# Patient Record
Sex: MALE | Race: BLACK OR AFRICAN AMERICAN | Employment: FULL TIME | URBAN - METROPOLITAN AREA
[De-identification: names, ages, dates, MRNs, and addresses within clinical notes are randomized per-mention and may not be internally consistent; named-entity substitution may affect disease eponyms.]

---

## 2017-08-21 ENCOUNTER — HOSPITAL ENCOUNTER (INPATIENT)
Age: 27
LOS: 2 days | Discharge: HOME | DRG: 897 | End: 2017-08-23
Attending: PSYCHIATRY & NEUROLOGY | Admitting: PSYCHIATRY & NEUROLOGY
Payer: COMMERCIAL

## 2017-08-21 PROBLEM — F25.9 SCHIZOAFFECTIVE DISORDER (HCC): Status: ACTIVE | Noted: 2017-08-21

## 2017-08-21 PROCEDURE — 65220000003 HC RM SEMIPRIVATE PSYCH

## 2017-08-21 RX ORDER — IBUPROFEN 200 MG
1 TABLET ORAL
Status: DISCONTINUED | OUTPATIENT
Start: 2017-08-21 | End: 2017-08-23 | Stop reason: HOSPADM

## 2017-08-21 RX ORDER — IBUPROFEN 400 MG/1
400 TABLET ORAL
Status: DISCONTINUED | OUTPATIENT
Start: 2017-08-21 | End: 2017-08-23 | Stop reason: HOSPADM

## 2017-08-21 RX ORDER — BENZTROPINE MESYLATE 2 MG/1
2 TABLET ORAL
Status: DISCONTINUED | OUTPATIENT
Start: 2017-08-21 | End: 2017-08-23 | Stop reason: HOSPADM

## 2017-08-21 RX ORDER — BENZTROPINE MESYLATE 1 MG/ML
2 INJECTION INTRAMUSCULAR; INTRAVENOUS
Status: DISCONTINUED | OUTPATIENT
Start: 2017-08-21 | End: 2017-08-23 | Stop reason: HOSPADM

## 2017-08-21 RX ORDER — OLANZAPINE 5 MG/1
5 TABLET ORAL
Status: DISCONTINUED | OUTPATIENT
Start: 2017-08-21 | End: 2017-08-23 | Stop reason: HOSPADM

## 2017-08-21 RX ORDER — LORAZEPAM 2 MG/ML
2 INJECTION INTRAMUSCULAR
Status: DISCONTINUED | OUTPATIENT
Start: 2017-08-21 | End: 2017-08-23 | Stop reason: HOSPADM

## 2017-08-21 RX ORDER — LORAZEPAM 1 MG/1
1 TABLET ORAL
Status: DISCONTINUED | OUTPATIENT
Start: 2017-08-21 | End: 2017-08-23 | Stop reason: HOSPADM

## 2017-08-21 RX ORDER — ADHESIVE BANDAGE
30 BANDAGE TOPICAL DAILY PRN
Status: DISCONTINUED | OUTPATIENT
Start: 2017-08-21 | End: 2017-08-23 | Stop reason: HOSPADM

## 2017-08-21 RX ORDER — ACETAMINOPHEN 325 MG/1
650 TABLET ORAL
Status: DISCONTINUED | OUTPATIENT
Start: 2017-08-21 | End: 2017-08-23 | Stop reason: HOSPADM

## 2017-08-21 RX ORDER — ZOLPIDEM TARTRATE 10 MG/1
10 TABLET ORAL
Status: DISCONTINUED | OUTPATIENT
Start: 2017-08-21 | End: 2017-08-23 | Stop reason: HOSPADM

## 2017-08-21 NOTE — PROGRESS NOTES
Skin Assessment performed by: VIRA, RN and AMANDA BACK    Skin is intact      Pressure Ulcer Documentation  (COMPLETE ONE LABEL PER PRESSURE ULCER)  For further information, please review corresponding Wound Care flowsheet. Yeimi Roman has:    No pressure injury noted and pressure ulcer prevention initiated.

## 2017-08-21 NOTE — IP AVS SNAPSHOT
[address] 
[phone] Patient: Luis White MRN: [id number] Current Discharge Medication List  
  
CONTINUE these medications which have NOT CHANGED Dose & Instructions Dispensing Information Comments Morning Noon Evening Bedtime ZyrTEC 10 mg tablet Generic drug:  cetirizine Notes to Patient:  No prescription Dose:  10 mg Take 10 mg by mouth daily as needed for Allergies. Refills:  0

## 2017-08-21 NOTE — BH NOTES
Hospitalist Consult called on:    Date: 8/21/2017  Time: 9111  Reason for consult: Pt arrived from outside facility  Urgency: Awaiting return call from Hospitalist to notify of consult. [facility]  Dr. Paras yDe advised of consult.

## 2017-08-21 NOTE — IP AVS SNAPSHOT
[address] 
[phone] Patient: Carmella Giles MRN: [id number] YEH:1/69/0358 You are allergic to the following No active allergies Recent Documentation Height Weight BMI Smoking Status 1.854 m 86.2 kg 25.07 kg/m2 Never Smoker Emergency Contacts Name Discharge Info Relation Home Work Mobile Ralph De Luna DISCHARGE CAREGIVER [3] Mother [14] [phone] About your hospitalization You were admitted on:  August 21, 2017 You last received care in the:  [facility] You were discharged on:  August 23, 2017 Unit phone number:  [phone] Why you were hospitalized Your primary diagnosis was:  Not on File Your diagnoses also included:  Schizoaffective Disorder (Hcc), Cannabis Intoxication (Hcc) Providers Seen During Your Hospitalizations Provider Role Specialty Primary office phone Manny Calvo MD Attending Provider Psychiatry [phone] Your Primary Care Physician (PCP) Primary Care Physician Office Phone Office Fax NONE ** None ** ** None ** Follow-up Information Follow up With Details Comments Contact Info [address] [place] [address] On 8/24/2017 Walk in for intake Monday - Thursday 8:30 to 5:00 and Friday 8:30 to 2:00 please lisa ID , any inurance information and medication list [place] [address] [address] [address] [phone] None   None (395) Patient stated that they have no PCP Current Discharge Medication List  
  
CONTINUE these medications which have NOT CHANGED Dose & Instructions Dispensing Information Comments Morning Noon Evening Bedtime ZyrTEC 10 mg tablet Generic drug:  cetirizine Notes to Patient:  No prescription Dose:  10 mg Take 10 mg by mouth daily as needed for Allergies. Refills:  0 Discharge Instructions DISCHARGE SUMMARY from Nurse The following personal items are in your possession at time of discharge: 
 
Dental Appliances: None Visual Aid: None Home Medications: None Jewelry: None Clothing: None Other Valuables: Personal toiletries (Toothbrush, pack of bar soap, lotion) Personal Items Sent to Safe: None Personal items returned to patient No prescriptions given PATIENT INSTRUCTIONS: 
 
 
 
What to do at Home: 
Recommended activity: Activity as tolerated, *  Please give a list of your current medications to your Primary Care Provider. *  Please update this list whenever your medications are discontinued, doses are 
    changed, or new medications (including over-the-counter products) are added. *  Please carry medication information at all times in case of emergency situations. These are general instructions for a healthy lifestyle: No smoking/ No tobacco products/ Avoid exposure to second hand smoke Surgeon General's Warning:  Quitting smoking now greatly reduces serious risk to your health. Obesity, smoking, and sedentary lifestyle greatly increases your risk for illness A healthy diet, regular physical exercise & weight monitoring are important for maintaining a healthy lifestyle You may be retaining fluid if you have a history of heart failure or if you experience any of the following symptoms:  Weight gain of 3 pounds or more overnight or 5 pounds in a week, increased swelling in our hands or feet or shortness of breath while lying flat in bed. Please call your doctor as soon as you notice any of these symptoms; do not wait until your next office visit. Recognize signs and symptoms of STROKE: 
 
F-face looks uneven A-arms unable to move or move unevenly S-speech slurred or non-existent T-time-call 911 as soon as signs and symptoms begin-DO NOT go  
 Back to bed or wait to see if you get better-TIME IS BRAIN. Warning Signs of HEART ATTACK Call 911 if you have these symptoms: 
? Chest discomfort. Most heart attacks involve discomfort in the center of the chest that lasts more than a few minutes, or that goes away and comes back. It can feel like uncomfortable pressure, squeezing, fullness, or pain. ? Discomfort in other areas of the upper body. Symptoms can include pain or discomfort in one or both arms, the back, neck, jaw, or stomach. ? Shortness of breath with or without chest discomfort. ? Other signs may include breaking out in a cold sweat, nausea, or lightheadedness. Don't wait more than five minutes to call [address]! Fast action can save your life. Calling 911 is almost always the fastest way to get lifesaving treatment. Emergency Medical Services staff can begin treatment when they arrive  up to an hour sooner than if someone gets to the hospital by car. The discharge information has been reviewed with the patient. The patient verbalized understanding. Discharge medications reviewed with the patient and appropriate educational materials and side effects teaching were provided. Gociety Activation Thank you for requesting access to Gociety. Please follow the instructions below to securely access and download your online medical record. Gociety allows you to send messages to your doctor, view your test results, renew your prescriptions, schedule appointments, and more. How Do I Sign Up? 1. In your internet browser, go to [email or web address] 
2. Click on the First Time User? Click Here link in the Sign In box. You will be redirect to the New Member Sign Up page. 3. Enter your Gociety Access Code exactly as it appears below. You will not need to use this code after youve completed the sign-up process. If you do not sign up before the expiration date, you must request a new code. PeekYou Access Code: [email or web address] Expires: 2017 11:48 AM (This is the date your PeekYou access code will ) 4. Enter the last four digits of your Social Security Number (xxxx) and Date of Birth (mm/dd/yyyy) as indicated and click Submit. You will be taken to the next sign-up page. 5. Create a PeekYou ID. This will be your PeekYou login ID and cannot be changed, so think of one that is secure and easy to remember. 6. Create a PeekYou password. You can change your password at any time. 7. Enter your Password Reset Question and Answer. This can be used at a later time if you forget your password. 8. Enter your e-mail address. You will receive e-mail notification when new information is available in [address] 9. Click Sign Up. You can now view and download portions of your medical record. 10. Click the Download Summary menu link to download a portable copy of your medical information. Additional Information If you have questions, please visit the Frequently Asked Questions section of the PeekYou website at [email or web address] Remember, PeekYou is NOT to be used for urgent needs. For medical emergencies, dial 911. DISCHARGE SUMMARY 
 
NAME:Sybil Burleson : 1990 MRN: [id number] The patient Oscar Durbin exhibits the ability to control behavior in a less restrictive environment. Patient's level of functioning is improving. No assaultive/destructive behavior has been observed for the past 24 hours. No suicidal/homicidal threat or behavior has been observed for the past 24 hours. There is no evidence of serious medication side effects. Patient has not been in physical or protective restraints for at least the past 24 hours. If weapons involved, how are they secured? No weapons involved Is patient aware of and in agreement with discharge plan? Patient is aware of discharge ans is in agreement Arrangements for medication: no prescriptions given to patient. Copy of discharge instructions to  provider?:  Yes, faxed to [facility] - [phone] Arrangements for transportation home:  Family to  Keep all follow up appointments as scheduled, continue to take prescribed medications per physician instructions. Mental health crisis number:  993 or your local mental health crisis line number at [phone] Discharge Orders None SpePharm Announcement We are excited to announce that we are making your provider's discharge notes available to you in SpePharm. You will see these notes when they are completed and signed by the physician that discharged you from your recent hospital stay. If you have any questions or concerns about any information you see in SpePharm, please call the Health Information Department where you were seen or reach out to your Primary Care Provider for more information about your plan of care. Introducing [facility]! [facility] introduces SpePharm patient portal. Now you can access parts of your medical record, email your doctor's office, and request medication refills online. 1. In your internet browser, go to [email or web address] 2. Click on the First Time User? Click Here link in the Sign In box. You will see the New Member Sign Up page. 3. Enter your SpePharm Access Code exactly as it appears below. You will not need to use this code after youve completed the sign-up process. If you do not sign up before the expiration date, you must request a new code. · SpePharm Access Code: [email or web address] Expires: 11/21/2017 11:48 AM 
 
4. Enter the last four digits of your Social Security Number (xxxx) and Date of Birth (mm/dd/yyyy) as indicated and click Submit. You will be taken to the next sign-up page. 5. Create a SpePharm ID.  This will be your SpePharm login ID and cannot be changed, so think of one that is secure and easy to remember. 6. Create a Greytip Software password. You can change your password at any time. 7. Enter your Password Reset Question and Answer. This can be used at a later time if you forget your password. 8. Enter your e-mail address. You will receive e-mail notification when new information is available in [address] 9. Click Sign Up. You can now view and download portions of your medical record. 10. Click the Download Summary menu link to download a portable copy of your medical information. If you have questions, please visit the Frequently Asked Questions section of the Greytip Software website. Remember, Greytip Software is NOT to be used for urgent needs. For medical emergencies, dial 911. Now available from your iPhone and Android! General Information Please provide this summary of care documentation to your next provider. Patient Signature:  ____________________________________________________________ Date:  ____________________________________________________________  
  
Tanya Hayden Provider Signature:  ____________________________________________________________ Date:  ____________________________________________________________

## 2017-08-21 NOTE — BH NOTES
Patient admitted per LINDA to Inpatient Acute Psychiatry, under the services of . Patient currently denies suicidal ideation. Patient currently denies homicidal ideation. Patient verbally contracts for safety. Patient denies psychotic symptoms, but reportedly was behaving bizarrely, prior to admission. Pt denies ETOH use. Pt denies drug use; however, UDS is positive for THC.

## 2017-08-21 NOTE — IP AVS SNAPSHOT
Summary of Care Report The Summary of Care report has been created to help improve care coordination. Users with access to [employer] or [address] (Web-based application) may access additional patient information including the Discharge Summary. If you are not currently a [address] user and need more information, please call the number listed below in the [place] [address] section and ask to be connected with Medical Records. Facility Information Name Address Phone [address] [facility] [phone] Patient Information Patient Name Sex  Al Monroy ([id number]) Male 1990 Discharge Information Admitting Provider Service Area Unit Fritz Fernandez MD / [id number] [facility] / [phone] Discharge Provider Discharge Date/Time Discharge Disposition Destination Vicky Louis MD / [phone] 2017 (Pending) A[facility] (none) Patient Language Language ENGLISH [13] Hospital Problems as of 2017  Never Reviewed Class Noted - Resolved Last Modified POA Active Problems Cannabis intoxication ([facility] [address]  2017 - Present 2017 by Vicky Louis MD No  
  Entered by Vicky Louis MD  
  
You are allergic to the following No active allergies Current Discharge Medication List  
  
CONTINUE these medications which have NOT CHANGED Dose & Instructions Dispensing Information Comments ZyrTEC 10 mg tablet Generic drug:  cetirizine Notes to Patient:  No prescription Dose:  10 mg Take 10 mg by mouth daily as needed for Allergies. Refills:  0 Follow-up Information Follow up With Details Comments Contact Info  [address] [place] [address] On 2017 Walk in for intake Monday - Thursday 8:30 to 5:00 and Friday 8:30 to 2:00 please lisa ID , any inurance information and medication list [facility] [address] [id number] [phone] None   None (395) Patient stated that they have no PCP Discharge Instructions DISCHARGE SUMMARY from Nurse The following personal items are in your possession at time of discharge: 
 
Dental Appliances: None Visual Aid: None Home Medications: None Jewelry: None Clothing: None Other Valuables: Personal toiletries (Toothbrush, pack of bar soap, lotion) Personal Items Sent to Safe: None Personal items returned to patient No prescriptions given PATIENT INSTRUCTIONS: 
 
 
 
What to do at Home: 
Recommended activity: Activity as tolerated, *  Please give a list of your current medications to your Primary Care Provider. *  Please update this list whenever your medications are discontinued, doses are 
    changed, or new medications (including over-the-counter products) are added. *  Please carry medication information at all times in case of emergency situations. These are general instructions for a healthy lifestyle: No smoking/ No tobacco products/ Avoid exposure to second hand smoke Surgeon General's Warning:  Quitting smoking now greatly reduces serious risk to your health. Obesity, smoking, and sedentary lifestyle greatly increases your risk for illness A healthy diet, regular physical exercise & weight monitoring are important for maintaining a healthy lifestyle You may be retaining fluid if you have a history of heart failure or if you experience any of the following symptoms:  Weight gain of 3 pounds or more overnight or 5 pounds in a week, increased swelling in our hands or feet or shortness of breath while lying flat in bed. Please call your doctor as soon as you notice any of these symptoms; do not wait until your next office visit. Recognize signs and symptoms of STROKE: 
 
F-face looks uneven A-arms unable to move or move unevenly S-speech slurred or non-existent T-time-call 911 as soon as signs and symptoms begin-DO NOT go Back to bed or wait to see if you get better-TIME IS BRAIN. Warning Signs of HEART ATTACK Call 911 if you have these symptoms: 
? Chest discomfort. Most heart attacks involve discomfort in the center of the chest that lasts more than a few minutes, or that goes away and comes back. It can feel like uncomfortable pressure, squeezing, fullness, or pain. ? Discomfort in other areas of the upper body. Symptoms can include pain or discomfort in one or both arms, the back, neck, jaw, or stomach. ? Shortness of breath with or without chest discomfort. ? Other signs may include breaking out in a cold sweat, nausea, or lightheadedness. Don't wait more than five minutes to call [address]! Fast action can save your life. Calling 911 is almost always the fastest way to get lifesaving treatment. Emergency Medical Services staff can begin treatment when they arrive  up to an hour sooner than if someone gets to the hospital by car. The discharge information has been reviewed with the patient. The patient verbalized understanding. Discharge medications reviewed with the patient and appropriate educational materials and side effects teaching were provided. MEDSEEK Activation Thank you for requesting access to MEDSEEK. Please follow the instructions below to securely access and download your online medical record. MEDSEEK allows you to send messages to your doctor, view your test results, renew your prescriptions, schedule appointments, and more. How Do I Sign Up? 1. In your internet browser, go to [email or web address] 
2. Click on the First Time User? Click Here link in the Sign In box. You will be redirect to the New Member Sign Up page. 3. Enter your Nano Game Studio Access Code exactly as it appears below. You will not need to use this code after youve completed the sign-up process. If you do not sign up before the expiration date, you must request a new code. Nano Game Studio Access Code: [email or web address] Expires: 2017 11:48 AM (This is the date your [employer] Game [employer] access code will ) 4. Enter the last four digits of your Social Security Number (xxxx) and Date of Birth (mm/dd/yyyy) as indicated and click Submit. You will be taken to the next sign-up page. 5. Create a Nano Game Studio ID. This will be your Nano Game Studio login ID and cannot be changed, so think of one that is secure and easy to remember. 6. Create a Nano Game Studio password. You can change your password at any time. 7. Enter your Password Reset Question and Answer. This can be used at a later time if you forget your password. 8. Enter your e-mail address. You will receive e-mail notification when new information is available in [address] 9. Click Sign Up. You can now view and download portions of your medical record. 10. Click the Download Summary menu link to download a portable copy of your medical information. Additional Information If you have questions, please visit the Frequently Asked Questions section of the Nano Game Studio website at [email or web address] Remember, Nano Game Studio is NOT to be used for urgent needs. For medical emergencies, dial 911. DISCHARGE SUMMARY 
 
NAME:Sybil Pryorer : 1990 MRN: [id number] The patient Tameka Martin exhibits the ability to control behavior in a less restrictive environment. Patient's level of functioning is improving. No assaultive/destructive behavior has been observed for the past 24 hours. No suicidal/homicidal threat or behavior has been observed for the past 24 hours. There is no evidence of serious medication side effects. Patient has not been in physical or protective restraints for at least the past 24 hours. If weapons involved, how are they secured? No weapons involved Is patient aware of and in agreement with discharge plan? Patient is aware of discharge ans is in agreement Arrangements for medication: no prescriptions given to patient. Copy of discharge instructions to  provider?:  Yes, faxed to [facility] - [phone] Arrangements for transportation home:  Family to  Keep all follow up appointments as scheduled, continue to take prescribed medications per physician instructions. Mental health crisis number:  [phone] or your local mental health crisis line number at [phone] Chart Review Routing History No Routing History on File

## 2017-08-22 PROBLEM — F12.929 CANNABIS INTOXICATION (HCC): Status: ACTIVE | Noted: 2017-08-22

## 2017-08-22 PROBLEM — F25.9 SCHIZOAFFECTIVE DISORDER (HCC): Status: RESOLVED | Noted: 2017-08-21 | Resolved: 2017-08-22

## 2017-08-22 PROCEDURE — 65220000003 HC RM SEMIPRIVATE PSYCH

## 2017-08-22 PROCEDURE — 74011250637 HC RX REV CODE- 250/637: Performed by: INTERNAL MEDICINE

## 2017-08-22 RX ORDER — ALBUTEROL SULFATE 0.83 MG/ML
2.5 SOLUTION RESPIRATORY (INHALATION)
Status: DISCONTINUED | OUTPATIENT
Start: 2017-08-22 | End: 2017-08-23 | Stop reason: HOSPADM

## 2017-08-22 RX ORDER — ALBUTEROL SULFATE 90 UG/1
1 AEROSOL, METERED RESPIRATORY (INHALATION)
Status: DISCONTINUED | OUTPATIENT
Start: 2017-08-22 | End: 2017-08-22 | Stop reason: CLARIF

## 2017-08-22 RX ORDER — LORATADINE 10 MG/1
10 TABLET ORAL
Status: DISCONTINUED | OUTPATIENT
Start: 2017-08-22 | End: 2017-08-23 | Stop reason: HOSPADM

## 2017-08-22 RX ADMIN — LORATADINE 10 MG: 10 TABLET ORAL at 21:58

## 2017-08-22 NOTE — INTERDISCIPLINARY ROUNDS
Behavioral Health Interdisciplinary Rounds     Patient Name: Tatum Lopez  Age: 32 y.o. Room/Bed:  736/02  Primary Diagnosis: Schizoaffective D/O   Admission Status: TDO     Readmission within 30 days: no  Power of  in place: no and   Patient requires a blocked bed: no          Reason for blocked bed:     VTE Prophylaxis: No  Mobility needs/Fall risk: no    Nutritional Plan: no  Consults:          Labs/Testing due today?: yes    Sleep hours:  5+      Participation in Care/Groups:    Medication Compliant?:   PRNS (last 24 hours): None    Restraints (last 24 hours):  no  Substance Abuse:  Yes   CIWA (range last 24 hours):  COWS (range last 24 hours):   Alcohol screening (AUDIT) completed -  AUDIT Score: 0  If applicable, date SBIRT discussed in treatment team AND documented:   Tobacco - patient is a smoker:   Date tobacco education completed by RN:   24 hour chart check complete: yes     Patient goal(s) for today:   Treatment team focus/goals:Plan to set up his [place] TDO hearing. LOS:  1  Expected LOS: TBD   Psychiatric Advanced Care Directives -  no   Name of Decision maker if patient has Psychiatric Care Directive   Patient was offered information  Financial concerns/prescription coverage:    Date of last family contact:       Family requesting physician contact today:   Discharge plan:He will return home  With his family.           Outpatient provider(s): [address]     Participating treatment team members: Winsome Giles, AMANDA

## 2017-08-22 NOTE — PROGRESS NOTES
Problem: Falls - Risk of  Goal: *Absence of Falls  Document Liban Fall Risk and appropriate interventions in the flowsheet. Outcome: Progressing Towards Goal  Fall Risk Interventions: pt noted free of falls as of this documentation. Pt resting quietly on bed with eyes closed.  No distress noted     24 hour chart check completed

## 2017-08-22 NOTE — H&P
INITIAL PSYCHIATRIC EVALUATION            IDENTIFICATION:    Patient Name  Mandie Rangel   Date of Birth 1990   [facility] [id number]   Medical Record Number  [id number]      Age  32 y.o. PCP None   Admit date:  8/21/2017    Room Number  736/02  @ [facility]   Date of Service  8/22/2017            HISTORY         REASON FOR HOSPITALIZATION:  CC: \"bizarre behavior during cannabis intoxication \". Pt admitted under a temporary assisted order (TDO) substance induced  psychosis proving to be an imminent danger to self and an inability to care for self. HISTORY OF PRESENT ILLNESS:    The patient, Mandie Rangel, is a 32 y.o. BLACK OR  male with a past psychiatric history significant for no proir psychiatric history, who presents at this time with complaints of (and/or evidence of) the following emotional symptoms: acute cannabis intoxication . Additional symptomatology include agitation. The above symptoms have been present for 1 day. These symptoms are of acute severity. These symptoms are intermittent/ fleeting in nature. The patient's condition has been precipitated by cannabis use and psychosocial stressors (resisting police assistance while intoxicated ). Patient's condition made worse by continued illicit drug use . UDS: +MJ ; BAL=0. ALLERGIES: No Known Allergies   MEDICATIONS PRIOR TO ADMISSION:   Prescriptions Prior to Admission   Medication Sig    cetirizine (ZYRTEC) 10 mg tablet Take 10 mg by mouth daily as needed for Allergies. PAST MEDICAL HISTORY:   Past Medical History:   Diagnosis Date    Substance abuse    History reviewed. No pertinent surgical history. SOCIAL HISTORY:    Social History     Social History    Marital status: SINGLE     Spouse name: N/A    Number of children: N/A    Years of education: N/A     Occupational History    Not on file.      Social History Main Topics    Smoking status: Never Smoker    Smokeless tobacco: Never Used    Alcohol use Yes      Comment: occasion    Drug use: Yes     Special: Marijuana    Sexual activity: No     Other Topics Concern    Not on file     Social History Narrative    32year old AA male admitted on TDO for running into the street during a family picknick. Pt had been smoking cannabis at this occasion. Police were summoned because he disrobed in public because \"it was hot outside\". He resisted police intervention and was brought to ED for psychiatric evaluation. Pt completed the 12th  Grade and has no prior history of any psychiatric treatment. FAMILY HISTORY:    History reviewed. No pertinent family history. REVIEW OF SYSTEMS:   Psychological ROS: negative  Respiratory ROS: no cough, shortness of breath, or wheezing  Cardiovascular ROS: no chest pain or dyspnea on exertion  Pertinent items are noted in the History of Present Illness. All other Systems reviewed and are considered negative. MENTAL STATUS EXAM & VITALS     MENTAL STATUS EXAM (MSE):    MSE FINDINGS ARE WITHIN NORMAL LIMITS (WNL) UNLESS OTHERWISE STATED BELOW. ( ALL OF THE BELOW CATEGORIES OF THE MSE HAVE BEEN REVIEWED (reviewed 8/22/2017) AND UPDATED AS DEEMED APPROPRIATE )  General Presentation age appropriate, cooperative   Orientation oriented to time, place and person   Vital Signs  See below (reviewed 8/22/2017); Vital Signs (BP, Pulse, & Temp) are within normal limits if not listed below.    Gait and Station Stable/steady, no ataxia   Musculoskeletal System No extrapyramidal symptoms (EPS); no abnormal muscular movements or Tardive Dyskinesia (TD); muscle strength and tone are within normal limits   Language No aphasia or dysarthria   Speech:  hyperverbal   Thought Processes concrete; normal rate of thoughts; poor abstract reasoning/computation   Thought Associations goal directed   Thought Content free of delusions, free of hallucinations and not internally preoccupied   Suicidal Ideations none   Homicidal Ideations none Mood:  stable    Affect:  mood-congruent   Memory recent  fair   Memory remote:  fair   Concentration/Attention:  distractable   Fund of Knowledge below average   Insight:  poor   Reliability fair   Judgment:  limited          VITALS:     Patient Vitals for the past 24 hrs:   Temp Pulse Resp BP SpO2   08/22/17 0835 97.6 °F (36.4 °C) 72 18 118/84 96 %   08/21/17 2040 98.4 °F (36.9 °C) 82 16 121/75 -   08/21/17 1737 98.1 °F (36.7 °C) 94 16 118/74 97 %     Wt Readings from Last 3 Encounters:   08/22/17 86.2 kg (190 lb)   12/16/16 104.3 kg (230 lb)     Temp Readings from Last 3 Encounters:   08/22/17 97.6 °F (36.4 °C)   12/16/16 98.4 °F (36.9 °C)     BP Readings from Last 3 Encounters:   08/22/17 118/84   12/16/16 124/60     Pulse Readings from Last 3 Encounters:   08/22/17 72   12/16/16 67            DATA     LABORATORY DATA:  Labs Reviewed   TSH 3RD GENERATION     No visits with results within 2 Day(s) from this visit. Latest known visit with results is:    Admission on 12/16/2016, Discharged on 12/16/2016   Component Date Value Ref Range Status    Glucose (POC) 12/16/2016 94  65 - 100 mg/dL Final    Performed by 61/66/3130 Sharmaine Landaverde   Final        RADIOLOGY REPORTS:  No results found for this or any previous visit. No results found.            MEDICATIONS       ALL MEDICATIONS  Current Facility-Administered Medications   Medication Dose Route Frequency    loratadine (CLARITIN) tablet 10 mg  10 mg Oral DAILY PRN    albuterol (PROVENTIL VENTOLIN) nebulizer solution 2.5 mg  2.5 mg Nebulization Q6H PRN    ziprasidone (GEODON) 20 mg in sterile water (preservative free) 1 mL injection  20 mg IntraMUSCular BID PRN    OLANZapine (ZyPREXA) tablet 5 mg  5 mg Oral Q6H PRN    benztropine (COGENTIN) tablet 2 mg  2 mg Oral BID PRN    benztropine (COGENTIN) injection 2 mg  2 mg IntraMUSCular BID PRN    LORazepam (ATIVAN) injection 2 mg  2 mg IntraMUSCular Q4H PRN    LORazepam (ATIVAN) tablet 1 mg  1 mg Oral Q4H PRN  zolpidem (AMBIEN) tablet 10 mg  10 mg Oral QHS PRN    acetaminophen (TYLENOL) tablet 650 mg  650 mg Oral Q4H PRN    ibuprofen (MOTRIN) tablet 400 mg  400 mg Oral Q8H PRN    magnesium hydroxide (MILK OF MAGNESIA) 400 mg/5 mL oral suspension 30 mL  30 mL Oral DAILY PRN    nicotine (NICODERM CQ) 21 mg/24 hr patch 1 Patch  1 Patch TransDERmal DAILY PRN      SCHEDULED MEDICATIONS  Current Facility-Administered Medications   Medication Dose Route Frequency                ASSESSMENT & PLAN        The patient, Jill Terrell, is a 32 y.o.  male who presents at this time for treatment of the following diagnoses:  Patient Active Hospital Problem List:   Cannabis intoxication ([facility] [address] (8/22/2017)    Assessment: Bizarre behavior with self endangerment in the context of smoking cannabis    Plan: Detox/ refer to 12 steps. .         A coordinated, multidisplinary treatment team (includes the nurse, unit pharmcist,  and writer) round was conducted for this initial evaluation with the patient present. . The patient was given the opportunity to ask questions. Informed consent given to the use of the above medications. I will continue to adjust psychiatric and non-psychiatric medications (see above \"medication\" section and orders section for details) as deemed appropriate & based upon diagnoses and response to treatment. I have reviewed admission (and previous/old) labs and medical tests in the EHR and or transferring hospital documents. I will continue to order blood tests/labs and diagnostic tests as deemed appropriate and review results as they become available (see orders for details). I have reviewed old psychiatric and medical records available in the EHR. I Will order additional psychiatric records from other institutions to further elucidate the nature of patient's psychopathology and review once available.     I will gather additional collateral information from friends, family and o/p treatment team to further elucidate the nature of patient's psychopathology and baselline level of psychiatric functioning.       ESTIMATED LENGTH OF STAY:    1-2 days        STRENGTHS:  Access to housing/residential stability, Interpersonal/supportive relationships (family, friends, peers) and Awareness of Substance abuse issues                                        SIGNED:    Elliott Valle MD  8/22/2017

## 2017-08-22 NOTE — PROGRESS NOTES
Problem: Psychosis  Goal: *STG: Remains safe in hospital  Outcome: Progressing Towards Goal  Pt has committed at his hearing today. Wally Mccall had difficulty processing information provided to him about why he was committed, but he does accept the decision with no acting out behavior. He is friendly during interactions with staff.

## 2017-08-22 NOTE — PROGRESS NOTES
Problem: Falls - Risk of  Goal: *Absence of Falls  Document Liban Fall Risk and appropriate interventions in the flowsheet. Outcome: Progressing Towards Goal  Fall Risk Interventions:No reported falls. Gait is steady. History of Falls Interventions: Bed/chair exit alarm           Problem: Psychosis  Goal: *STG: Decreased delusional thinking  Outcome: Progressing Towards Goal  No delusional content expressed concerning breakfast but consumed minimal amount and appeared paranoid. Goal: *STG: Remains safe in hospital  Outcome: Progressing Towards Goal  Denies SI/HI and is lisa for safety on unit.

## 2017-08-22 NOTE — BH NOTES
GROUP THERAPY PROGRESS NOTE    The patient Nazanin Briceno is participating in [employer]. Group time: 30 minutes    Personal goal for participation: to orient the patient to the unit.     Goal orientation: successful adoption of unit rules    Group therapy participation: active    Therapeutic interventions reviewed and discussed: Yes    Impression of participation:     Rosana Bartlett  8/22/2017 10:48 AM

## 2017-08-22 NOTE — CONSULTS
[address]   [address] [facility], [address] [place], [place] [address]   [address] [place] [address]       Name:  Maximo De La Torre   MR#:  [id number]   :  1990   Account #:  [de-identified]    Date of Consultation:  2017   Date of Adm:  2017       PRIMARY CARE PHYSICIAN: None. REQUESTING PHYSICIAN: Whit Syed MD    REASON FOR MEDICAL CONSULT: Routine history and physical   required for admission into the psychiatric unit as well as management   of asthma. SOURCE OF INFORMATION: The patient. CHIEF COMPLAINT: None. HISTORY OF PRESENT ILLNESS: This is a 24-year-old man with a   past medical history significant for asthma and substance abuse, who   was admitted to the psychiatric unit today for evaluation and treatment   of schizoaffective disorder. Medical consult was requested for routine   history and physical required for admission into the psychiatric unit as   well as management of her asthma. The patient has asthma. The   patient hardly used the inhaler. No recent history of asthma   exacerbation. The patient has no specific medical complaint at this   time. The patient has no shortness of breath. No chest pain, no fever,   no rigors. PAST MEDICAL HISTORY: Asthma. ALLERGIES: NO KNOWN DRUG ALLERGIES. MEDICATIONS   1. Albuterol inhaler as needed. 2. Zyrtec 10 mg daily as needed for allergies. FAMILY HISTORY: This was reviewed. His mother has anemia. PAST SURGICAL HISTORY: This is significant for removal polyp from   the nose. SOCIAL HISTORY: No history of alcohol or tobacco abuse. REVIEW OF SYSTEMS   HEAD, EYES, EARS, NOSE AND THROAT: No headache, no   dizziness, no blurring of vision, no photophobia. RESPIRATORY: No cough, no shortness of breath, no hemoptysis. CARDIOVASCULAR: No chest pain, no orthopnea, no palpitation. GASTROINTESTINAL: No nausea, vomiting, no diarrhea, no   constipation. GENITOURINARY: No dysuria, no urgency, and no frequency.  All   other systems are reviewed and they are negative. PHYSICAL EXAMINATION   GENERAL APPEARANCE: The patient appeared stable, in no acute   distress. VITAL SIGNS: Temperature 98.1, pulse 94, blood pressure 118/74,   respiratory rate 16, oxygen saturation 97%. HEENT: Normocephalic, atraumatic. EYES: Normal eye movement. No redness, no drainage, no discharge. EARS: Normal external ears with no obvious drainage. NOSE: No instead substernal, no drainage. MOUTH AND THROAT: No visible oral lesions. NECK: Supple. No JVD. No thyromegaly. CHEST: Clear breath sounds. No wheezing, no crackles. HEART: Normal S1 and S2, regular. No clinically appreciable murmur. ABDOMEN: Soft, nontender, normal bowel sounds. CENTRAL NERVOUS SYSTEM: Alert, oriented x3. No gross focal   neurological deficits. EXTREMITIES: No edema. Pulses 2+ bilaterally:   MUSCULOSKELETAL: No obvious joint deformity or swelling. SKIN: No active skin lesions seen in the exposed parts of the body. PSYCHIATRY: Normal mood, flat affect. LYMPHATIC: No cervical lymphadenopathy. DIAGNOSTIC DATA: None. LABORATORY DATA: None. ASSESSMENT   1. Schizoaffective disorder. 2. Asthma. PLAN   1. Schizoaffective disorder. The patient will continue evaluation and   treatment for schizoaffective disorder as per Psychiatry. This is the   reason why the patient was admitted to the psychiatric unit. 2. Asthma. The patient is asymptomatic. Will place on Albuterol inhaler   as needed. Will also continue with Zyrtec. In summary, this is a 49-year-old man who was admitted to the   psychiatric unit for evaluation and treatment of schizoaffective disorder. Medical consult was requested for routine history and physical required   for admission into the psychiatric unit as well as management of   asthma. The management of the patient's medical problems are as   stated above. Thank you for involving the hospitalist service in the care of this   patient.  We will continue to follow the patient alongside with the   psychiatric service. Less than 30 minutes spent on this medical consult.         MD Tyler Garrison / Benja Reid   D:  08/21/2017   23:21   T:  08/22/2017   00:30   Job #:  [id number]

## 2017-08-22 NOTE — BH NOTES
GROUP THERAPY PROGRESS NOTE    Tameka Martin participated in an Acute Unit Afternoon Process Group with a focus on identifying feelings, stating a goal for the rest of the day, and reviewing three DBT Emotion Regulation Skills: Building Mastery; Reducing vulnerability by managing ones physical health; and Building positive experiences. Group time: 45 minutes. Personal goal for participation: To increase the capacity to identify feelings, provide structure for the afternoon and evening today, and explore the potential to expand ones coping skills. Goal orientation: The patient will be able to identify feelings and explore additional coping skills, like setting an achievable goal in the short-term or paying attention to pleasure in ones life. Group therapy participation: With prompting, this patient partially participated in the group. Therapeutic interventions reviewed and discussed: The group members were asked to introduce themselves to each other and to see if they could identify an emotion they are having and/or let the group know what they want to focus on for the day as they continue to make discharge plans. They also reviewed a handout on the DBT Emotion Regulations Skills related to Building mastery, Taking care of ones physical needs, and Building positive experiences. Copies of the handout were distributed to the group members to keep and review on their own time. Impression of participation: The patient said he was feeling \"good\" and that he hoped the  would be able to let him go after his hearing. He indicated that he was here in the hospital because his body overheated and he had \"disrobed out in a field. \" The patient voiced no SI/HI and expressed no overt psychotic symptoms in group. He shared some about his interest in music and was supportive of his peers. He left the group about fifteen minutes before the end of group and headed down the becker to his room.  His affect was calm and his mood was cooperative. This was the patient's first process group with the undersigned.

## 2017-08-22 NOTE — PROGRESS NOTES
Problem: Falls - Risk of   Goal: *Absence of Falls  Document Liban Fall Risk and appropriate interventions in the flowsheet. Fall Risk Interventions:                          History of Falls Interventions: Bed/chair exit alarm           Problem: Psychosis 8/25/2017  Goal: *STG: Remains safe in hospital  Outcome: Progressing Towards Goal  Denies SI/HI. stated \"I love myself and I don't want to hurt nobody. \"  Goal: *STG/LTG: Demonstrates improved thought patterns as evidenced by logical and coherent speech  Outcome: Progressing Towards Goal  Stated \"I  realize why I`m here. I should`t have reacted like I did about I was a little hot. I was angry about my girlfriends mother. I was  just acting crazy. \"TDO process explained to patient and he verbalized understanding. Problem: Discharge Planning  Goal: *Discharge to safe environment  Outcome: Progressing Towards Goal  Stated\" I have a place to go with my girlfriend. I want to go after the hearing. \"    [facility]  Master Treatment Plan for Carmella Giles    Date Treatment Plan Initiated: 8/22/2016    Treatment Plan Modalities:  Type of Modality Amount  (x minutes) Frequency (x/week) Duration (x days) Name of Responsible Staff   Community & wrap-up meetings to encourage peer interactions 15 7 1 Duane Steven JAYME     Group psychotherapy to assist in building coping skills and internal controls 60 7 1 Bishnu Escobedo LCSW   Therapeutic activity groups to build coping skills 60 7 1 Bishnu Escobedo LCSW   Psychoeducation in group setting to address:   Medication education   15 7 1 KIZZY Holbrook RN   Coping skills         Relaxation techniques         Symptom management         Discharge planning         Spirituality    [address] [place] [id number]   60 1 1 Dr Rahman Livers   Recovery/AA/NA   61 3 1 Volunteer   Physician medication management   15 7 1 Dr Bossman Hobson

## 2017-08-22 NOTE — BH NOTES
PSYCHOSOCIAL ASSESSMENT    Patient identifying info:  Marlin Somers is a 32 y.o., male admitted 2017  4:57 PM     Presenting problem and precipitating factors:Patient was admitted on a [employer] TDO due to bizarre and dangerous behavior. Current psychiatric/substance abuse providers and contact info:no current treatment     Previous psychiatric or substance abuse services/providers and response to treatment:no    SW spoke to his mom , no family history of mental illness. Family history of substance abuse or mental illness:unknown  Substance abuse history:   Social History   Substance Use Topics    Smoking status: Never Smoker    Smokeless tobacco: Never Used    Alcohol use Yes      Comment: occasion       History of biomedical complications associated with substance abuse:  None noted     Patient's current acceptance of treatment or motivation for change:poor     Family constellation:single , no children     Is significant other involved?      Describe support system:     Describe living arrangements and home environment: lives with his family     Health issues:   Hospital Problems  Never Reviewed          Codes Class Noted POA    Cannabis intoxication ([facility] [address] ICD-10-CM: F12.929  ICD-9-CM: 292.89  2017 No            Trauma history: none noted     Legal issues: no    History of  service: no    Financial status: unemployed     Mosque/cultural factors: none noted     Education/work history:  High school education - some college     Have you been licensed as a health care professional ( current or  ) : no    Leisure and recreation preferences:unknown     Describe coping skills:ineffectual   Duong Prado  2017

## 2017-08-22 NOTE — DISCHARGE SUMMARY
PSYCHIATRIC DISCHARGE SUMMARY         IDENTIFICATION:    Patient Name  Jared Hernandez   Date of Birth 1990   [facility] [id number]   Medical Record Number  [id number]      Age  32 y.o. PCP None   Admit date:  8/21/2017    Discharge date: 8/22/2017   Room Number  736/02  @ [facility]   Date of Service  8/22/2017               TYPE OF DISCHARGE: REGULAR               CONDITION AT DISCHARGE: good       PROVISIONAL & DISCHARGE DIAGNOSES:    Problem List  Never Reviewed          Codes Class    Cannabis intoxication ([facility] [address] ICD-10-CM: F12.929  ICD-9-CM: 292.89               Active Hospital Problems    Cannabis intoxication ([facility] [address]        DISCHARGE DIAGNOSIS:   Axis I:  SEE ABOVE  Axis II: SEE ABOVE  Axis III: SEE ABOVE  Axis IV:  lack of structure  Axis V:  70 on admission, 70 on discharge 70(baseline)       CC & HISTORY OF PRESENT ILLNESS:  32year old AA male admitted for bizarre behavior while intoxicated with cannabis. Pt's symptoms resolved. He did not meet TDO criteria. At discharge he denied SI/HI intent and plan. SOCIAL HISTORY:    Social History     Social History    Marital status: SINGLE     Spouse name: N/A    Number of children: N/A    Years of education: N/A     Occupational History    Not on file. Social History Main Topics    Smoking status: Never Smoker    Smokeless tobacco: Never Used    Alcohol use Yes      Comment: occasion    Drug use: Yes     Special: Marijuana    Sexual activity: No     Other Topics Concern    Not on file     Social History Narrative    32year old AA male admitted on TDO for running into the street during a family picknick. Pt had been smoking cannabis at this occasion. Police were summoned because he disrobed in public because \"it was hot outside\". He resisted police intervention and was brought to ED for psychiatric evaluation. Pt completed the 12th  Grade and has no prior history of any psychiatric treatment. FAMILY HISTORY:   History reviewed.  No pertinent family history. HOSPITALIZATION COURSE:    Elizabeth Avendano was admitted to the inpatient psychiatric unit [facility] for acute psychiatric stabilization in regards to symptomatology as described in the HPI above. The differential diagnosis at time of admission included: acute intoxication . While on the unit Elizabeth Avendano was involved in individual, group, occupational and milieu therapy. Elizabeth Avendano demonstrated a slow, but progressive improvement in overall condition. Much of patient's depression appeared to be related to situational stressors and psychological factors. Please see individual progress notes for more specific details regarding patient's hospitalization course. At time of dc, Elizabeth Avendano was without significant problems with depression psychosis  arabella. Overall presentation at time of discharge is most consistent with the diagnosis of cannabis intoxication Patient with request for discharge today. There are no grounds to seek a TDO. Patient has maximized benefit to be derived from acute inpatient psychiatric treatment. All members of the treatment team concur with each other in regards to plans for discharge today per patient's request.          LABS AND IMAGAING:    Labs Reviewed   TSH 3RD GENERATION     No visits with results within 14 Day(s) from this visit. Latest known visit with results is:    Admission on 12/16/2016, Discharged on 12/16/2016   Component Date Value Ref Range Status    Glucose (POC) 12/16/2016 94  65 - 100 mg/dL Final    Performed by [id number]/[id number] Corina Hall   Final     No results found. DISPOSITION:    ome. Patient to f/u with o/p drug/etoh rehabilitation, psychiatric, and psychotherapy appointments. Patient is to f/u with internist as directed. FOLLOW-UP CARE:    Activity as tolerated  Regular Diet  Wound Care: none needed.   Follow-up Information     Follow up With Details Comments Contact Info    None None ([phone]) Patient stated that they have no PCP                   PROGNOSIS:  Greatly dependent upon patient's ability to remain sober and to f/u with o/p drug/etoh rehabilitation and psychiatric/psychotherapy appointments as well as to comply with psychiatric medications as prescribed. Patient denies suicidal or homicidal ideations. Eachbaby fully contracts for safety. Patient reports many positive predictive factors in terms of not attempting suicide or homicide. Patient appears to be at low risk of suicide or homicide. Patient and family are aware and in agreement with discharge and discharge plan. DISCHARGE MEDICATIONS: (no changes made). Informed consent given for the use of following psychotropic medications:  Current Discharge Medication List      CONTINUE these medications which have NOT CHANGED    Details   cetirizine (ZYRTEC) 10 mg tablet Take 10 mg by mouth daily as needed for Allergies. A coordinated, multidisplinary treatment team round was conducted with Emerson Moritz is done daily here at [facility] . This team consists of the nurse, psychiatric unit pharmcist,  and writer. I have spent greater than 35 minutes on discharge work.     Signed:  Radha Pulido MD  8/22/2017

## 2017-08-23 VITALS
RESPIRATION RATE: 16 BRPM | HEIGHT: 73 IN | OXYGEN SATURATION: 100 % | HEART RATE: 65 BPM | DIASTOLIC BLOOD PRESSURE: 75 MMHG | SYSTOLIC BLOOD PRESSURE: 120 MMHG | BODY MASS INDEX: 25.18 KG/M2 | TEMPERATURE: 97.4 F | WEIGHT: 190 LBS

## 2017-08-23 NOTE — PROGRESS NOTES
Problem: Psychosis  Goal: *STG: Remains safe in hospital  Outcome: Progressing Towards Goal  Received pt resting in bed quietly. Respirations regular, even and unlabored. NAD. Q-15 checks for safety. 24 hr chart review completed.

## 2017-08-23 NOTE — PROGRESS NOTES
Problem: Falls - Risk of  Goal: *Absence of Falls  Document Liban Fall Risk and appropriate interventions in the flowsheet. Fall Risk Interventions:     Non slip socks   Bed in low position          Medication Interventions: Teach patient to arise slowly           History of Falls Interventions: Bed/chair exit alarm           Problem: Psychosis  Goal: *STG: Decreased delusional thinking  Outcome: Progressing Towards Goal  Pt. Met in treatment team with Dr. Yanci Danielle   No lability or delusional thinking exhibited  More organized thinking    Discharge today planned  Without prescriptions  Goal: *STG/LTG: Complies with medication therapy  Outcome: Progressing Towards Goal  No prn medications required   Goal: *STG/LTG: Demonstrates improved thought patterns as evidenced by logical and coherent speech  Outcome: Progressing Towards Goal  Improved thought patterns exhibited  Goal: Interventions  Outcome: Progressing Towards Goal  Will continue to monitor  On 15 min. Checks for safety    Assess   Thought process medication effectiveness   Assist with discharge plans     No prescriptions given to patient    1315  Pt.  Discharged with friends no prescriptions

## 2017-08-23 NOTE — INTERDISCIPLINARY ROUNDS
Behavioral Health Interdisciplinary Rounds     Patient Name: Oscar Durbin  Age: 32 y.o. Room/Bed:  726/02  Primary Diagnosis: <principal problem not specified>   Admission Status: Involuntary Commitment     Readmission within 30 days: no  Power of  in place: no  Patient requires a blocked bed: no          Reason for blocked bed: N/A    VTE Prophylaxis: Not indicated  Mobility needs/Fall risk: no    Nutritional Plan: no  Consults:          Labs/Testing due today?: yes    Sleep hours: 6.75 hrs       Participation in Care/Groups:  yes  Medication Compliant?: Yes  PRNS (last 24 hours): None    Restraints (last 24 hours):  no  Substance Abuse:  Yes   / [patient was positive for THC CIWA (range last 24 hours):  COWS (range last 24 hours):   Alcohol screening (AUDIT) completed -  AUDIT Score: 0  If applicable, date SBIRT discussed in treatment team AND documented:   Tobacco - patient is a smoker: no   Date tobacco education completed by RN: N/A  24 hour chart check complete: yes     Patient goal(s) for today:   Treatment team focus/goals: Plan for discharge today   LOS:  2  Expected LOS: discharge today   Psychiatric Advanced Care Directives -  no   Name of Decision maker if patient has Psychiatric Care Directive   Patient was offered information   Financial concerns/prescription coverage:    Date of last family contact: - SW spoke to patients mom this am regarding admission.       Family requesting physician contact today:   Discharge plan: He will return home with family       Outpatient provider(s): [address]    Participating treatment team members: CAROL Jacques - Dr. Tere Harmon, RN

## 2017-08-23 NOTE — DISCHARGE INSTRUCTIONS
DISCHARGE SUMMARY from Nurse    The following personal items are in your possession at time of discharge:    Dental Appliances: None  Visual Aid: None     Home Medications: None  Jewelry: None  Clothing: None  Other Valuables: Personal toiletries (Toothbrush, pack of bar soap, lotion)  Personal Items Sent to Safe: None    Personal items returned to patient  No prescriptions given          PATIENT INSTRUCTIONS:        What to do at Home:  Recommended activity: Activity as tolerated,           *  Please give a list of your current medications to your Primary Care Provider. *  Please update this list whenever your medications are discontinued, doses are      changed, or new medications (including over-the-counter products) are added. *  Please carry medication information at all times in case of emergency situations. These are general instructions for a healthy lifestyle:    No smoking/ No tobacco products/ Avoid exposure to second hand smoke    Surgeon General's Warning:  Quitting smoking now greatly reduces serious risk to your health. Obesity, smoking, and sedentary lifestyle greatly increases your risk for illness    A healthy diet, regular physical exercise & weight monitoring are important for maintaining a healthy lifestyle    You may be retaining fluid if you have a history of heart failure or if you experience any of the following symptoms:  Weight gain of 3 pounds or more overnight or 5 pounds in a week, increased swelling in our hands or feet or shortness of breath while lying flat in bed. Please call your doctor as soon as you notice any of these symptoms; do not wait until your next office visit. Recognize signs and symptoms of STROKE:    F-face looks uneven    A-arms unable to move or move unevenly    S-speech slurred or non-existent    T-time-call 911 as soon as signs and symptoms begin-DO NOT go       Back to bed or wait to see if you get better-TIME IS BRAIN.     Warning Signs of HEART ATTACK     Call 911 if you have these symptoms:   Chest discomfort. Most heart attacks involve discomfort in the center of the chest that lasts more than a few minutes, or that goes away and comes back. It can feel like uncomfortable pressure, squeezing, fullness, or pain.  Discomfort in other areas of the upper body. Symptoms can include pain or discomfort in one or both arms, the back, neck, jaw, or stomach.  Shortness of breath with or without chest discomfort.  Other signs may include breaking out in a cold sweat, nausea, or lightheadedness. Don't wait more than five minutes to call 911 - MINUTES MATTER! Fast action can save your life. Calling 911 is almost always the fastest way to get lifesaving treatment. Emergency Medical Services staff can begin treatment when they arrive -- up to an hour sooner than if someone gets to the hospital by car. The discharge information has been reviewed with the patient. The patient verbalized understanding. Discharge medications reviewed with the patient and appropriate educational materials and side effects teaching were provided. [employer] Activation    Thank you for requesting access to [employer]. Please follow the instructions below to securely access and download your online medical record. [employer] allows you to send messages to your doctor, view your test results, renew your prescriptions, schedule appointments, and more. How Do I Sign Up? 1. In your internet browser, go to [email or web address]  2. Click on the First Time User? Click Here link in the Sign In box. You will be redirect to the New Member Sign Up page. 3. Enter your [employer] Access Code exactly as it appears below. You will not need to use this code after youve completed the sign-up process. If you do not sign up before the expiration date, you must request a new code.     [employer] Access Code: [email or web address]  Expires: 11/21/2017 11:48 AM (This is the date your [employer] access code will )    4. Enter the last four digits of your Social Security Number (xxxx) and Date of Birth (mm/dd/yyyy) as indicated and click Submit. You will be taken to the next sign-up page. 5. Create a [employer] ID. This will be your [employer] login ID and cannot be changed, so think of one that is secure and easy to remember. 6. Create a [employer] password. You can change your password at any time. 7. Enter your Password Reset Question and Answer. This can be used at a later time if you forget your password. 8. Enter your e-mail address. You will receive e-mail notification when new information is available in [address] 9. Click Sign Up. You can now view and download portions of your medical record. 10. Click the Download Summary menu link to download a portable copy of your medical information. Additional Information    If you have questions, please visit the Frequently Asked Questions section of the [email or web address] website at [email or web address] Remember, [employer] is NOT to be used for urgent needs. For medical emergencies, dial 911. DISCHARGE SUMMARY    NAME:Sybil Garcia  : 1990  MRN: [id number]    The patient Glory Ruffing exhibits the ability to control behavior in a less restrictive environment. Patient's level of functioning is improving. No assaultive/destructive behavior has been observed for the past 24 hours. No suicidal/homicidal threat or behavior has been observed for the past 24 hours. There is no evidence of serious medication side effects. Patient has not been in physical or protective restraints for at least the past 24 hours. If weapons involved, how are they secured? No weapons involved     Is patient aware of and in agreement with discharge plan? Patient is aware of discharge ans is in agreement     Arrangements for medication: no prescriptions given to patient.      Copy of discharge instructions to  provider?:  Yes, faxed to [facility] [phone]     Arrangements for transportation home:  Family to    Keep all follow up appointments as scheduled, continue to take prescribed medications per physician instructions.   Mental health crisis number:  465 or your local mental health crisis line number at [phone]

## 2017-08-23 NOTE — BH NOTES
GROUP THERAPY PROGRESS NOTE    Octavio Jamesricarda did not participate in the Acute Unit's Process Group, with a focus identifying feelings, planning for the rest of the day, and discussing discharge. He was preparing for discharge and finalizing his aftercare plans.

## 2017-08-23 NOTE — BH NOTES
Behavioral Health Transition Record to Provider    Patient Name: Zoë Abdi  YOB: 1990  Medical Record Number: [id number]  Date of Admission: 8/21/2017  Date of Discharge: 8/23/2017    Attending Provider: No att. providers found  Discharging Provider: Dr. Eaton Cords   To contact this individual call [phone]  and ask the  to page. If unavailable, ask to be transferred to [facility] Provider on call. [facility] Provider will be available on call 24/7 and during holidays. Primary Care Provider: None    No Known Allergies       H&P Summary Notes      H&P by Emily Dent MD at 08/22/17 0477     Author:  Emily Dent MD Service:  PSYCHIATRY Author Type:  Physician    Filed:  08/22/17 1151 Date of Service:  08/22/17 1147 Status:  Signed    :  Emily Dent MD (Physician)             INITIAL PSYCHIATRIC EVALUATION            IDENTIFICATION:    Patient Name  Zoë Abdi   Date of Birth 1990   [facility] [id number]   Medical Record Number  [id number]      Age  32 y.o. PCP None   Admit date:  8/21/2017    Room Number  736/02  @ [facility]   Date of Service  8/22/2017            HISTORY         REASON FOR HOSPITALIZATION:  CC: \"bizarre behavior during cannabis intoxication \". Pt admitted under a temporary FDC order (TDO) substance induced  psychosis proving to be an imminent danger to self and an inability to care for self. HISTORY OF PRESENT ILLNESS:    The patient, Zoë Abdi, is a 32 y.o. BLACK OR  male with a past psychiatric history significant for no proir psychiatric history, who presents at this time with complaints of (and/or evidence of) the following emotional symptoms: acute cannabis intoxication . Additional symptomatology include agitation. The above symptoms have been present for 1 day. These symptoms are of acute severity. These symptoms are intermittent/ fleeting in nature.   The patient's condition has been precipitated by cannabis use and psychosocial stressors (resisting police assistance while intoxicated ). Patient's condition made worse by continued illicit drug use . UDS: +MJ ; BAL=0. ALLERGIES: No Known Allergies   MEDICATIONS PRIOR TO ADMISSION:   Prescriptions Prior to Admission   Medication Sig    cetirizine (ZYRTEC) 10 mg tablet Take 10 mg by mouth daily as needed for Allergies. PAST MEDICAL HISTORY:   Past Medical History:   Diagnosis Date    Substance abuse    History reviewed. No pertinent surgical history. SOCIAL HISTORY:    Social History     Social History    Marital status: SINGLE     Spouse name: N/A    Number of children: N/A    Years of education: N/A     Occupational History    Not on file. Social History Main Topics    Smoking status: Never Smoker    Smokeless tobacco: Never Used    Alcohol use Yes      Comment: occasion    Drug use: Yes     Special: Marijuana    Sexual activity: No     Other Topics Concern    Not on file     Social History Narrative    32year old AA male admitted on TDO for running into the street during a family picknick. Pt had been smoking cannabis at this occasion. Police were summoned because he disrobed in public because \"it was hot outside\". He resisted police intervention and was brought to ED for psychiatric evaluation. Pt completed the 12th  Grade and has no prior history of any psychiatric treatment. FAMILY HISTORY:    History reviewed. No pertinent family history. REVIEW OF SYSTEMS:   Psychological ROS: negative  Respiratory ROS: no cough, shortness of breath, or wheezing  Cardiovascular ROS: no chest pain or dyspnea on exertion  Pertinent items are noted in the History of Present Illness. All other Systems reviewed and are considered negative.            MENTAL STATUS EXAM & VITALS     MENTAL STATUS EXAM (MSE):    MSE FINDINGS ARE WITHIN NORMAL LIMITS (WNL) UNLESS OTHERWISE STATED BELOW. ( ALL OF THE BELOW CATEGORIES OF THE MSE HAVE BEEN REVIEWED (reviewed 8/22/2017) AND UPDATED AS DEEMED APPROPRIATE )  General Presentation age appropriate, cooperative   Orientation oriented to time, place and person   Vital Signs  See below (reviewed 8/22/2017); Vital Signs (BP, Pulse, & Temp) are within normal limits if not listed below. Gait and Station Stable/steady, no ataxia   Musculoskeletal System No extrapyramidal symptoms (EPS); no abnormal muscular movements or Tardive Dyskinesia (TD); muscle strength and tone are within normal limits   Language No aphasia or dysarthria   Speech:  hyperverbal   Thought Processes concrete; normal rate of thoughts; poor abstract reasoning/computation   Thought Associations goal directed   Thought Content free of delusions, free of hallucinations and not internally preoccupied   Suicidal Ideations none   Homicidal Ideations none   Mood:  stable    Affect:  mood-congruent   Memory recent  fair   Memory remote:  fair   Concentration/Attention:  distractable   Fund of Knowledge below average   Insight:  poor   Reliability fair   Judgment:  limited          VITALS:     Patient Vitals for the past 24 hrs:   Temp Pulse Resp BP SpO2   08/22/17 0835 97.6 °F (36.4 °C) 72 18 118/84 96 %   08/21/17 2040 98.4 °F (36.9 °C) 82 16 121/75 -   08/21/17 1737 98.1 °F (36.7 °C) 94 16 118/74 97 %     Wt Readings from Last 3 Encounters:   08/22/17 86.2 kg (190 lb)   12/16/16 104.3 kg (230 lb)     Temp Readings from Last 3 Encounters:   08/22/17 97.6 °F (36.4 °C)   12/16/16 98.4 °F (36.9 °C)     BP Readings from Last 3 Encounters:   08/22/17 118/84   12/16/16 124/60     Pulse Readings from Last 3 Encounters:   08/22/17 72   12/16/16 67            DATA     LABORATORY DATA:  Labs Reviewed   TSH 3RD GENERATION     No visits with results within 2 Day(s) from this visit.   Latest known visit with results is:    Admission on 12/16/2016, Discharged on 12/16/2016   Component Date Value Ref Range Status    Glucose (POC) 12/16/2016 94  65 - 100 mg/dL Final    Performed by 13/11/6713 Madelin Mena   Final        RADIOLOGY REPORTS:  No results found for this or any previous visit. No results found. MEDICATIONS       ALL MEDICATIONS  Current Facility-Administered Medications   Medication Dose Route Frequency    loratadine (CLARITIN) tablet 10 mg  10 mg Oral DAILY PRN    albuterol (PROVENTIL VENTOLIN) nebulizer solution 2.5 mg  2.5 mg Nebulization Q6H PRN    ziprasidone (GEODON) 20 mg in sterile water (preservative free) 1 mL injection  20 mg IntraMUSCular BID PRN    OLANZapine (ZyPREXA) tablet 5 mg  5 mg Oral Q6H PRN    benztropine (COGENTIN) tablet 2 mg  2 mg Oral BID PRN    benztropine (COGENTIN) injection 2 mg  2 mg IntraMUSCular BID PRN    LORazepam (ATIVAN) injection 2 mg  2 mg IntraMUSCular Q4H PRN    LORazepam (ATIVAN) tablet 1 mg  1 mg Oral Q4H PRN    zolpidem (AMBIEN) tablet 10 mg  10 mg Oral QHS PRN    acetaminophen (TYLENOL) tablet 650 mg  650 mg Oral Q4H PRN    ibuprofen (MOTRIN) tablet 400 mg  400 mg Oral Q8H PRN    magnesium hydroxide (MILK OF MAGNESIA) 400 mg/5 mL oral suspension 30 mL  30 mL Oral DAILY PRN    nicotine (NICODERM CQ) 21 mg/24 hr patch 1 Patch  1 Patch TransDERmal DAILY PRN      SCHEDULED MEDICATIONS  Current Facility-Administered Medications   Medication Dose Route Frequency                ASSESSMENT & PLAN        The patient, Nilson De La Torre, is a 32 y.o.  male who presents at this time for treatment of the following diagnoses:  Patient Active Hospital Problem List:   Cannabis intoxication ([facility] [place] [address] (8/22/2017)    Assessment: Bizarre behavior with self endangerment in the context of smoking cannabis    Plan: Detox/ refer to 12 steps. .         A coordinated, multidisplinary treatment team (includes the nurse, unit pharmcist,  and writer) round was conducted for this initial evaluation with the patient present. . The patient was given the opportunity to ask questions.  Informed consent given to the use of the above medications. I will continue to adjust psychiatric and non-psychiatric medications (see above \"medication\" section and orders section for details) as deemed appropriate & based upon diagnoses and response to treatment. I have reviewed admission (and previous/old) labs and medical tests in the EHR and or transferring hospital documents. I will continue to order blood tests/labs and diagnostic tests as deemed appropriate and review results as they become available (see orders for details). I have reviewed old psychiatric and medical records available in the EHR. I Will order additional psychiatric records from other institutions to further elucidate the nature of patient's psychopathology and review once available. I will gather additional collateral information from friends, family and o/p treatment team to further elucidate the nature of patient's psychopathology and baselline level of psychiatric functioning. ESTIMATED LENGTH OF STAY:    1-2 days        STRENGTHS:  Access to housing/residential stability, Interpersonal/supportive relationships (family, friends, peers) and Awareness of Substance abuse issues                                        SIGNED:    Nidia Seymour MD  8/22/2017[MH1.1]       Revision History       User Key Date/Time User Provider Type Action    > MH1.1 08/22/17 Russel Dunaway MD Physician Sign              Admission Diagnosis: schizoaffective  Schizoaffective disorder ([facility] [address]    * No surgery found *    Results for orders placed or performed during the hospital encounter of 12/16/16   GLUCOSE, POC   Result Value Ref Range    Glucose (POC) 94 65 - 100 mg/dL    Performed by Nitza Sher        Immunizations administered during this encounter: There is no immunization history on file for this patient.     Screening for Metabolic Disorders for Patients on Antipsychotic Medications    Estimated Body Mass Index  Estimated body mass index is 25.07 kg/(m^2) as calculated from the following:    Height as of this encounter: 6' 1\" (1.854 m). Weight as of this encounter: 86.2 kg (190 lb). Vital Signs/Blood Pressure  Visit Vitals    /75    Pulse 65    Temp 97.4 °F (36.3 °C)    Resp 16    Ht 6' 1\" (1.854 m)    Wt 86.2 kg (190 lb)    SpO2 100%    BMI 25.07 kg/m2       Blood Glucose/Hemoglobin A1c  Lab Results   Component Value Date/Time    Glucose (POC) 94 2016 02:38 AM        No results found for: HBA1C, HGBE8, GXU2SJXE     Lipid Panel  No results found for: CHOL, CHOLX, CHLST, CHOLV, 460518, HDL, LDL, LDLC, DLDLP, TGLX, TRIGL, TRIGP, CHHD, CHHDX         Discharge Diagnosis: Cannabis Intoxication     Discharge Plan:He will return home. DISCHARGE SUMMARY    NAME:Sybil Garcia  : 1990  MRN: [id number]    The patient Jona Rodgers exhibits the ability to control behavior in a less restrictive environment. Patient's level of functioning is improving. No assaultive/destructive behavior has been observed for the past 24 hours. No suicidal/homicidal threat or behavior has been observed for the past 24 hours. There is no evidence of serious medication side effects. Patient has not been in physical or protective restraints for at least the past 24 hours. If weapons involved, how are they secured? No weapons involved     Is patient aware of and in agreement with discharge plan? Patient is aware of discharge ans is in agreement     Arrangements for medication: no prescriptions given to patient. Copy of discharge instructions to  provider?:  Yes, faxed to [id number] [facility] for transportation home:  Family to    Keep all follow up appointments as scheduled, continue to take prescribed medications per physician instructions.   Mental health crisis number:  101 or your local mental health crisis line number at [phone]               Discharge Medication List and Instructions:   Discharge Medication List as of 8/23/2017 12:05 PM      CONTINUE these medications which have NOT CHANGED    Details   cetirizine (ZYRTEC) 10 mg tablet Take 10 mg by mouth daily as needed for Allergies. , Historical Med             Unresulted Labs     None        To obtain results of studies pending at discharge, please contact [phone]     Follow-up Information     Follow up With Details Comments [address] [facility] On 8/24/2017 Walk in for intake Monday - Thursday 8:30 to 5:00 and Friday 8:30 to 2:00 please lisa ID , any inurance information and medication list [id number] C/Devaughn Blackwell Final  [phone]    None   None (395) Patient stated that they have no PCP            Advanced Directive:   Does the patient have an appointed surrogate decision maker? No  Does the patient have a Medical Advance Directive? No  Does the patient have a Psychiatric Advance Directive? No  If the patient does not have a surrogate or Medical Advance Directive AND Psychiatric Advance Directive, the patient was offered information on these advance directives Patient declined to complete    Patient Instructions: Please continue all medications until otherwise directed by physician. Tobacco Cessation Discharge Plan:   Is the patient a smoker and needs referral for smoking cessation? No  Patient referred to the following for smoking cessation with an appointment? Not applicable     Patient was offered medication to assist with smoking cessation at discharge? Not applicable  Was education for smoking cessation added to the discharge instructions? Not applicable    Alcohol/Substance Abuse Discharge Plan:   Does the patient have a history of substance/alcohol abuse and requires a referral for treatment? Yes  Patient referred to the following for substance/alcohol abuse treatment with an appointment?  Yes- 8/24/32017 at 8:00 [facility]   Patient was offered medication to assist with alcohol cessation at discharge? No  Was education for substance/alcohol abuse added to discharge instructions? No    Patient discharged to Home; discussed with patient/caregiver, provided to the patient/caregiver either in hard copy or electronically. and patient refused hard copy.

## 2017-08-23 NOTE — BH NOTES
LEISURE GROUP THERAPY PROGRESS NOTE    The patient Elizabeth Avendano a 32 y.o. male is participating in Leisure Group. Group time: 45 minutes    Personal goal for participation: Daily social interactions with other peers & staff.     Goal orientation: Relaxation activities    Group therapy participation: active    Therapeutic interventions reviewed and discussed:  Yes    Impression of participation: Active with questions    Carina Breath  8/22/2017  8:38 PM

## 2021-01-10 PROCEDURE — 99283 EMERGENCY DEPT VISIT LOW MDM: CPT

## 2021-01-11 ENCOUNTER — APPOINTMENT (OUTPATIENT)
Dept: GENERAL RADIOLOGY | Age: 31
End: 2021-01-11
Attending: EMERGENCY MEDICINE
Payer: MEDICAID

## 2021-01-11 ENCOUNTER — HOSPITAL ENCOUNTER (EMERGENCY)
Age: 31
Discharge: HOME | End: 2021-01-11
Attending: EMERGENCY MEDICINE
Payer: MEDICAID

## 2021-01-11 VITALS
SYSTOLIC BLOOD PRESSURE: 144 MMHG | DIASTOLIC BLOOD PRESSURE: 90 MMHG | HEART RATE: 74 BPM | WEIGHT: 250 LBS | BODY MASS INDEX: 33.86 KG/M2 | TEMPERATURE: 97.5 F | OXYGEN SATURATION: 97 % | HEIGHT: 72 IN | RESPIRATION RATE: 18 BRPM

## 2021-01-11 DIAGNOSIS — Z20.822 SUSPECTED COVID-19 VIRUS INFECTION: Primary | ICD-10-CM

## 2021-01-11 PROCEDURE — 87635 SARS-COV-2 COVID-19 AMP PRB: CPT

## 2021-01-11 PROCEDURE — 74011250637 HC RX REV CODE- 250/637: Performed by: EMERGENCY MEDICINE

## 2021-01-11 PROCEDURE — 71045 X-RAY EXAM CHEST 1 VIEW: CPT

## 2021-01-11 RX ORDER — IBUPROFEN 600 MG/1
600 TABLET ORAL
Status: COMPLETED | OUTPATIENT
Start: 2021-01-11 | End: 2021-01-11

## 2021-01-11 RX ADMIN — IBUPROFEN 600 MG: 600 TABLET, FILM COATED ORAL at 00:29

## 2021-01-11 NOTE — DISCHARGE INSTRUCTIONS
We hope that we have addressed all of your medical concerns. The examination and treatment you received in the Emergency Department were for an emergent problem and were not intended as complete care. It is important that you follow up with your healthcare provider(s) for ongoing care. If your symptoms worsen or do not improve as expected, and you are unable to reach your usual health care provider(s), you should return to the Emergency Department. Today's healthcare is undergoing tremendous change, and patient satisfaction surveys are one of the many tools to assess the quality of medical care. You may receive a survey from the [employer] organization regarding your experience in the Emergency Department. I hope that your experience has been completely positive, particularly the medical care that I provided. As such, please participate in the survey; anything less than excellent does not meet my expectations or intentions. [facility] and [facility] participate in nationally recognized quality of care measures. If your blood pressure is greater than 120/80, as reported below, we urge that you seek medical care to address the potential of high blood pressure, commonly known as hypertension. Hypertension can be hereditary or can be caused by certain medical conditions, pain, stress, or \"white coat syndrome. \"       Please make an appointment with your health care provider(s) for follow up of your Emergency Department visit. VITALS:   Patient Vitals for the past 8 hrs:   Temp Pulse Resp BP SpO2   01/10/21 2221 97.5 °F (36.4 °C) 87 18 (!) 128/94 98 %          Thank you for allowing us to provide you with medical care today. We realize that you have many choices for your emergency care needs. Please choose us in the future for any continued health care needs. Riki Resendez, [employer]. Office: [phone]            No results found for this or any previous visit (from the past 24 hour(s)). Xr Chest Port    Result Date: 1/11/2021  EXAM: XR CHEST PORT INDICATION: Cough and shortness of breath. COVID19 rule out. COMPARISON: None TECHNIQUE: Upright portable chest AP view FINDINGS: The cardiomediastinal and hilar contours are within normal limits. The pulmonary vasculature is within normal limits. Left lung base opacity is subtle. Elevated right hemidiaphragm is nonspecific. Upper lobes are clear. No pneumothorax. The visualized bones and upper abdomen are age-appropriate. IMPRESSION: Left lung base atelectasis versus pneumonia. Consider PA and lateral chest views when the patient can better tolerate.

## 2021-01-11 NOTE — Clinical Note
[address] 
[facility] EMERGENCY DEPT 
[address] [place] [address] [phone] Work/School Note Date: 1/10/2021 To Whom It May concern: 
 
Frannie Pritchard was evaulated by the following provider(s): 
Attending Provider: Tati Aleah virus is suspected. Per the CDC guidelines we recommend home isolation until the following conditions are all met: 1. At least 10 days have passed since symptoms first appeared and 2. At least 24 hours have passed since last fever without the use of fever-reducing medications and 
3. Symptoms (e.g., cough, shortness of breath) have improved Sincerely, Marielena Kuhn, DO
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
## 2021-01-11 NOTE — ED TRIAGE NOTES
Pt reports generalized body aches and eye redness onset about 2 days ago. Mother recently tested positive for covid and he is concerned he has it too. Denies SOB or fevers.

## 2021-01-12 ENCOUNTER — PATIENT OUTREACH (OUTPATIENT)
Dept: CASE MANAGEMENT | Age: 31
End: 2021-01-12

## 2021-01-12 LAB
COVID-19, XGCOVT: DETECTED
HEALTH STATUS, XMCV2T: ABNORMAL
SOURCE, COVRS: ABNORMAL
SPECIMEN SOURCE, FCOV2M: ABNORMAL
SPECIMEN TYPE, XMCV1T: ABNORMAL

## 2021-01-12 NOTE — PROGRESS NOTES
Patient contacted regarding COVID-19 exposure. Discussed COVID-19 related testing which was pending at this time. Test results were pending. Patient informed of results, if available? yes     Care Transition Nurse/ Ambulatory Care Manager contacted the patient by telephone to perform post discharge assessment. Call within 2 business days of discharge: Yes Verified name and  with patient as identifiers. Provided introduction to self, and explanation of the CTN/ACM role, and reason for call due to risk factors for infection and/or exposure to COVID-19. Symptoms reviewed with patient who verbalized the following symptoms: no new symptoms and no worsening symptoms      Due to no new or worsening symptoms encounter was not routed to provider for escalation. Discussed follow-up appointments. If no appointment was previously scheduled, appointment scheduling offered:  Pt states that he is working with medicaid and will establish with a PCP once he knows who he can see. We discussed that on second call, we will work together to establish care. [address] follow up appointment(s): No future appointments. Non-[facility] follow up appointment(s): NA     Advance Care Planning:   Does patient have an Advance Directive:  not discussed on this call. Patient has following risk factors of: no known risk factors and ED visit to [facility] on 2021 and pt reports that his mother, whom he lives with, is COVID19 +. CTN/ACM reviewed discharge instructions, medical action plan and red flags such as increased shortness of breath, increasing fever and signs of decompensation with patient who verbalized understanding. Discussed exposure protocols and quarantine with CDC Guidelines What to do if you are sick with coronavirus disease .  Patient was given an opportunity for questions and concerns.  The patient agrees to contact the [facility] exposure line [phone],  [facility] and PCP office for questions related to their healthcare. CTN/ACM provided contact information for future needs. Reviewed and educated patient on any new and changed medications related to discharge diagnosis     Patient/family/caregiver given information for [facility] and agrees to enroll no    Plan for follow-up call in 1-2 days based on severity of symptoms and risk factors. Pt states understanding of discharge instructions and reports that he has a headache but is feeling okay. Pt reports that he currently lives with his mother who, pt reports, tested COVID19+. We discussed self-quarantine, handwashing, social distancing, and wiping down all high touch surfaces in the home daily. Pt was [facility] [employer] and [facility] telephone numbers and pt has my contact information. Pt verified that he does not have mychart and is agreeable to setting that up on next call if he has not done so. Pt is agreeable to a telephone call in 2 days.

## 2021-01-15 ENCOUNTER — PATIENT OUTREACH (OUTPATIENT)
Dept: CASE MANAGEMENT | Age: 31
End: 2021-01-15

## 2021-01-15 NOTE — PROGRESS NOTES
Called pt to follow up on ED visit to [facility] on 1/11/2021. Unable to LVM. Will follow up on Monday. Noted that pt was called by [facility] concerning COVID19 test results.

## 2021-01-19 ENCOUNTER — PATIENT OUTREACH (OUTPATIENT)
Dept: CASE MANAGEMENT | Age: 31
End: 2021-01-19

## 2021-01-26 ENCOUNTER — PATIENT OUTREACH (OUTPATIENT)
Dept: CASE MANAGEMENT | Age: 31
End: 2021-01-26

## 2021-01-26 NOTE — PROGRESS NOTES
Patient resolved from [address] Transitions episode on 1/26/2021  Discussed COVID-19 related testing which was available at this time. Test results were positive. Patient informed of results, if available? yes     Patient/family has been provided the following resources and education related to COVID-19:                         Signs, symptoms and red flags related to COVID-19            [facility] exposure and quarantine guidelines            [facility] exposure contact - [phone]            Contact for their local Department of Health                 Patient currently reports that the following symptoms have improved:  no new symptoms, no worsening symptoms and pt reports that he still gets a little lightheaded and reports that he is to have surgery in February 2021 and will be tested for [address]. No further outreach scheduled with this CTN/ACM/LPN/HC/ MA. Episode of Care resolved. Patient has this CTN/ACM/LPN/HC/MA contact information if future needs arise.

## 2022-03-18 PROBLEM — F12.929 CANNABIS INTOXICATION (HCC): Status: ACTIVE | Noted: 2017-08-22

## 2022-06-08 ENCOUNTER — APPOINTMENT (OUTPATIENT)
Dept: GENERAL RADIOLOGY | Age: 32
End: 2022-06-08
Attending: EMERGENCY MEDICINE
Payer: MEDICAID

## 2022-06-08 ENCOUNTER — HOSPITAL ENCOUNTER (EMERGENCY)
Age: 32
Discharge: HOME | End: 2022-06-08
Attending: EMERGENCY MEDICINE
Payer: MEDICAID

## 2022-06-08 VITALS
DIASTOLIC BLOOD PRESSURE: 82 MMHG | BODY MASS INDEX: 26.82 KG/M2 | SYSTOLIC BLOOD PRESSURE: 120 MMHG | HEART RATE: 73 BPM | TEMPERATURE: 98.2 F | HEIGHT: 72 IN | WEIGHT: 198 LBS | OXYGEN SATURATION: 97 %

## 2022-06-08 DIAGNOSIS — M79.646 PAIN OF FINGER, UNSPECIFIED LATERALITY: Primary | ICD-10-CM

## 2022-06-08 PROCEDURE — 73130 X-RAY EXAM OF HAND: CPT

## 2022-06-08 PROCEDURE — 99283 EMERGENCY DEPT VISIT LOW MDM: CPT

## 2022-06-08 RX ORDER — CEPHALEXIN 500 MG/1
500 CAPSULE ORAL 4 TIMES DAILY
Qty: 28 CAPSULE | Refills: 0 | Status: SHIPPED | OUTPATIENT
Start: 2022-06-08 | End: 2022-06-08 | Stop reason: SDUPTHER

## 2022-06-08 RX ORDER — CEPHALEXIN 500 MG/1
500 CAPSULE ORAL 2 TIMES DAILY
Qty: 14 CAPSULE | Refills: 0 | Status: SHIPPED | OUTPATIENT
Start: 2022-06-08 | End: 2022-06-15
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
## 2024-05-06 ENCOUNTER — HOSPITAL ENCOUNTER (EMERGENCY)
Facility: HOSPITAL | Age: 34
Discharge: HOME | End: 2024-05-06
Attending: STUDENT IN AN ORGANIZED HEALTH CARE EDUCATION/TRAINING PROGRAM
Payer: MEDICAID

## 2024-05-06 VITALS
HEIGHT: 73 IN | SYSTOLIC BLOOD PRESSURE: 132 MMHG | TEMPERATURE: 98.6 F | BODY MASS INDEX: 30.48 KG/M2 | HEART RATE: 61 BPM | RESPIRATION RATE: 16 BRPM | DIASTOLIC BLOOD PRESSURE: 75 MMHG | OXYGEN SATURATION: 97 % | WEIGHT: 230 LBS
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
## 2024-05-06 PROCEDURE — 99283 EMERGENCY DEPT VISIT LOW MDM: CPT

## 2024-05-06 RX ORDER — PERMETHRIN 50 MG/G
CREAM TOPICAL
Qty: 60 G | Refills: 1 | Status: SHIPPED | OUTPATIENT
Start: 2024-05-06

## 2024-05-06 ASSESSMENT — LIFESTYLE VARIABLES
HOW OFTEN DO YOU HAVE A DRINK CONTAINING ALCOHOL: NEVER
HOW MANY STANDARD DRINKS CONTAINING ALCOHOL DO YOU HAVE ON A TYPICAL DAY: PATIENT DOES NOT DRINK

## 2024-05-06 ASSESSMENT — PAIN - FUNCTIONAL ASSESSMENT: PAIN_FUNCTIONAL_ASSESSMENT: NONE - DENIES PAIN

## 2024-05-07 ASSESSMENT — ENCOUNTER SYMPTOMS: SHORTNESS OF BREATH: 0

## 2024-05-07 NOTE — ED TRIAGE NOTES
Pt presents ambulatory into ed a&ox3, no acute distress, breaths even and unlabored c/o rash to all over body since Sunday morning. Pt reports he stayed at a friends house Saturday night and woke up Sunday morning with bumps on legs, neck, hands, back and arms. Reports itchiness, denies pain. Denies seeing any bugs or anyone else with same symptoms.
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
## 2024-05-09 ENCOUNTER — HOSPITAL ENCOUNTER (EMERGENCY)
Facility: HOSPITAL | Age: 34
Discharge: HOME | End: 2024-05-10
Attending: EMERGENCY MEDICINE
Payer: MEDICAID

## 2024-05-09 VITALS
BODY MASS INDEX: 30.48 KG/M2 | HEART RATE: 81 BPM | RESPIRATION RATE: 16 BRPM | OXYGEN SATURATION: 96 % | TEMPERATURE: 98.4 F | DIASTOLIC BLOOD PRESSURE: 54 MMHG | WEIGHT: 230 LBS | SYSTOLIC BLOOD PRESSURE: 122 MMHG | HEIGHT: 73 IN

## 2024-05-09 DIAGNOSIS — L50.9 URTICARIA: Primary | ICD-10-CM

## 2024-05-09 PROCEDURE — 99283 EMERGENCY DEPT VISIT LOW MDM: CPT

## 2024-05-09 ASSESSMENT — PAIN - FUNCTIONAL ASSESSMENT: PAIN_FUNCTIONAL_ASSESSMENT: NONE - DENIES PAIN

## 2024-05-10 PROCEDURE — 6370000000 HC RX 637 (ALT 250 FOR IP): Performed by: EMERGENCY MEDICINE

## 2024-05-10 RX ORDER — DIPHENHYDRAMINE HCL 25 MG
25 CAPSULE ORAL ONCE
Status: COMPLETED | OUTPATIENT
Start: 2024-05-10 | End: 2024-05-10

## 2024-05-10 RX ORDER — PREDNISONE 20 MG/1
40 TABLET ORAL ONCE
Status: COMPLETED | OUTPATIENT
Start: 2024-05-10 | End: 2024-05-10

## 2024-05-10 RX ORDER — DIPHENHYDRAMINE HCL 25 MG
25 CAPSULE ORAL EVERY 6 HOURS PRN
Qty: 30 CAPSULE | Refills: 0 | Status: SHIPPED | OUTPATIENT
Start: 2024-05-10 | End: 2024-05-20

## 2024-05-10 RX ORDER — PREDNISONE 20 MG/1
40 TABLET ORAL DAILY
Qty: 10 TABLET | Refills: 0 | Status: SHIPPED | OUTPATIENT
Start: 2024-05-10 | End: 2024-05-15

## 2024-05-10 RX ADMIN — PREDNISONE 40 MG: 20 TABLET ORAL at 00:14

## 2024-05-10 RX ADMIN — DIPHENHYDRAMINE HYDROCHLORIDE 25 MG: 25 CAPSULE ORAL at 00:14
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
## 2024-05-10 NOTE — ED TRIAGE NOTES
Patient arrives to ED with c/o skin irritation/reaction to the permethrin he was prescribed at his last visit. Patient states the bumps have become larger and more irritated.